# Patient Record
Sex: FEMALE | Race: BLACK OR AFRICAN AMERICAN
[De-identification: names, ages, dates, MRNs, and addresses within clinical notes are randomized per-mention and may not be internally consistent; named-entity substitution may affect disease eponyms.]

---

## 2020-03-01 ENCOUNTER — HOSPITAL ENCOUNTER (EMERGENCY)
Dept: HOSPITAL 12 - ER | Age: 25
Discharge: HOME | End: 2020-03-01
Payer: SELF-PAY

## 2020-03-01 VITALS — BODY MASS INDEX: 18.96 KG/M2 | HEIGHT: 63 IN | WEIGHT: 107 LBS

## 2020-03-01 DIAGNOSIS — K21.9: ICD-10-CM

## 2020-03-01 DIAGNOSIS — Z60.2: ICD-10-CM

## 2020-03-01 DIAGNOSIS — J02.9: Primary | ICD-10-CM

## 2020-03-01 PROCEDURE — A4663 DIALYSIS BLOOD PRESSURE CUFF: HCPCS

## 2020-03-01 SDOH — SOCIAL STABILITY - SOCIAL INSECURITY: PROBLEMS RELATED TO LIVING ALONE: Z60.2

## 2020-03-01 NOTE — NUR
Patient discharged to home in stable condition.  Written and verbal after care 
instructions given. 

Patient verbalizes understanding of instructions.

## 2022-07-14 ENCOUNTER — HOSPITAL ENCOUNTER (EMERGENCY)
Dept: HOSPITAL 12 - ER | Age: 27
LOS: 1 days | Discharge: ANOTHER HOSPITAL | End: 2022-07-15
Payer: COMMERCIAL

## 2022-07-14 VITALS — HEIGHT: 63 IN | BODY MASS INDEX: 18.78 KG/M2 | WEIGHT: 106 LBS

## 2022-07-14 DIAGNOSIS — R50.9: ICD-10-CM

## 2022-07-14 DIAGNOSIS — D72.829: ICD-10-CM

## 2022-07-14 DIAGNOSIS — N92.0: ICD-10-CM

## 2022-07-14 DIAGNOSIS — Z20.822: ICD-10-CM

## 2022-07-14 DIAGNOSIS — R10.9: Primary | ICD-10-CM

## 2022-07-14 LAB
ALP SERPL-CCNC: 62 U/L (ref 50–136)
ALT SERPL W/O P-5'-P-CCNC: 28 U/L (ref 14–59)
AMPHETAMINES UR QL SCN>1000 NG/ML: NEGATIVE
APPEARANCE UR: CLEAR
AST SERPL-CCNC: 24 U/L (ref 15–37)
BILIRUB DIRECT SERPL-MCNC: 0.2 MG/DL (ref 0–0.2)
BILIRUB SERPL-MCNC: 0.7 MG/DL (ref 0.2–1)
BILIRUB UR QL STRIP: (no result)
BUN SERPL-MCNC: 7 MG/DL (ref 7–18)
CHLORIDE SERPL-SCNC: 102 MMOL/L (ref 98–107)
CO2 SERPL-SCNC: 23 MMOL/L (ref 21–32)
COCAINE UR QL SCN: NEGATIVE
COLOR UR: YELLOW
CREAT SERPL-MCNC: 0.8 MG/DL (ref 0.6–1.3)
DEPRECATED SQUAMOUS URNS QL MICRO: (no result) /HPF
GLUCOSE SERPL-MCNC: 98 MG/DL (ref 74–106)
GLUCOSE UR STRIP-MCNC: NEGATIVE MG/DL
HCG UR QL: NEGATIVE
HCT VFR BLD AUTO: 31.8 % (ref 31.2–41.9)
HGB UR QL STRIP: (no result)
KETONES UR STRIP-MCNC: (no result) MG/DL
LEUKOCYTE ESTERASE UR QL STRIP: NEGATIVE
LIPASE SERPL-CCNC: 24 U/L (ref 73–393)
MCH RBC QN AUTO: 29.1 UUG (ref 24.7–32.8)
MCV RBC AUTO: 87.2 FL (ref 75.5–95.3)
MUCOUS THREADS URNS QL MICRO: (no result) /LPF
NITRITE UR QL STRIP: NEGATIVE
OPIATES UR QL SCN: NEGATIVE
PCP UR QL SCN>25 NG/ML: NEGATIVE
PH UR STRIP: 6 [PH] (ref 5–8)
PLATELET # BLD AUTO: 259 K/UL (ref 179–408)
POTASSIUM SERPL-SCNC: 3.5 MMOL/L (ref 3.5–5.1)
RBC #/AREA URNS HPF: (no result) /HPF (ref 0–3)
SP GR UR STRIP: 1.02 (ref 1–1.03)
THC UR QL SCN>50 NG/ML: NEGATIVE
UROBILINOGEN UR STRIP-MCNC: 0.2 E.U./DL
WBC #/AREA URNS HPF: (no result) /HPF
WBC #/AREA URNS HPF: (no result) /HPF (ref 0–3)
WS STN SPEC: 7.9 G/DL (ref 6.4–8.2)

## 2022-07-14 PROCEDURE — 99285 EMERGENCY DEPT VISIT HI MDM: CPT

## 2022-07-14 PROCEDURE — 83690 ASSAY OF LIPASE: CPT

## 2022-07-14 PROCEDURE — 85730 THROMBOPLASTIN TIME PARTIAL: CPT

## 2022-07-14 PROCEDURE — 84703 CHORIONIC GONADOTROPIN ASSAY: CPT

## 2022-07-14 PROCEDURE — 81001 URINALYSIS AUTO W/SCOPE: CPT

## 2022-07-14 PROCEDURE — 74177 CT ABD & PELVIS W/CONTRAST: CPT

## 2022-07-14 PROCEDURE — A4663 DIALYSIS BLOOD PRESSURE CUFF: HCPCS

## 2022-07-14 PROCEDURE — A9150 MISC/EXPER NON-PRESCRIPT DRU: HCPCS

## 2022-07-14 PROCEDURE — 36415 COLL VENOUS BLD VENIPUNCTURE: CPT

## 2022-07-14 PROCEDURE — 76856 US EXAM PELVIC COMPLETE: CPT

## 2022-07-14 PROCEDURE — 93005 ELECTROCARDIOGRAM TRACING: CPT

## 2022-07-14 PROCEDURE — 85025 COMPLETE CBC W/AUTO DIFF WBC: CPT

## 2022-07-14 PROCEDURE — 96376 TX/PRO/DX INJ SAME DRUG ADON: CPT

## 2022-07-14 PROCEDURE — 87400 INFLUENZA A/B EACH AG IA: CPT

## 2022-07-14 PROCEDURE — 80076 HEPATIC FUNCTION PANEL: CPT

## 2022-07-14 PROCEDURE — 87426 SARSCOV CORONAVIRUS AG IA: CPT

## 2022-07-14 PROCEDURE — 96375 TX/PRO/DX INJ NEW DRUG ADDON: CPT

## 2022-07-14 PROCEDURE — 96365 THER/PROPH/DIAG IV INF INIT: CPT

## 2022-07-14 PROCEDURE — 96361 HYDRATE IV INFUSION ADD-ON: CPT

## 2022-07-14 PROCEDURE — 80307 DRUG TEST PRSMV CHEM ANLYZR: CPT

## 2022-07-14 PROCEDURE — 80048 BASIC METABOLIC PNL TOTAL CA: CPT

## 2022-07-14 PROCEDURE — 71045 X-RAY EXAM CHEST 1 VIEW: CPT

## 2022-07-14 NOTE — NUR
Patient was seen walking briskly to bathroom from ER bed 1A, denies pains@the 
moment, still for transfer to another acute hospital.

## 2022-07-14 NOTE — NUR
SBAR REPORT GIVEN TO TATIANNA. 





PT NOTED TO BE IN GURNEY ASLEEP, RESTING COMFORTABLY, BREATHING EVEN AND 
UNLABORED. VSS.

## 2022-07-14 NOTE — NUR
Patient updated re: insurance & pending transfer information still. Patient 
verbalized understanding. No questions at this time.

## 2022-07-14 NOTE — NUR
RENÉ FROM [facility] CALLED AND REQUESTED CLINICALS. WILL BE FAXING SUMMARY 
REPORT TO FAX #[phone]

## 2022-07-14 NOTE — NUR
PT ACCEPTED TO [facility], WILL AWAIT CALL BACK FOR TRANFER INFO. PT 
AWKNOLEDGE THAT SHE WILL BE TRANSFERING TO DIFFERENT HOSPITAL.

## 2022-07-14 NOTE — NUR
Patient is waiting for transfer information from her [place] 
[employer] insurance and for possible transfer to [facility] vs another 
acute hospital with gynecology inpatient services. Patient is resting 
comfortably on gurney with eyes closed, NAD.

## 2022-07-14 NOTE — NUR
SIM SOLISDASHAWN REPRESENTATIVE CALLED WITH INFORMATION PT IS GOING TO [facility], ACCEPTING DR IS DR SWEET.

## 2022-07-14 NOTE — NUR
ER registration staff Jimi is following up this transfer process with 
[place] [employer].

## 2022-07-14 NOTE — NUR
ER registration staff Yin is following up the transfer process for this 
patient, no acute change in patient's condition seen.

## 2022-07-14 NOTE — NUR
Patient is still waiting for transfer information (e.g. accepting doctor & 
nurse with available room from [facility]) 2/2 patient's insurance 
authorization. NPO maintained except medicines. Patient wants popsicle and pain 
medicine, MD notified.

## 2022-07-15 NOTE — NUR
Patient Tranfers to outside Facility via [facility]



Physician: DR Ponce 



Location: [facility]